# Patient Record
Sex: FEMALE | Race: WHITE
[De-identification: names, ages, dates, MRNs, and addresses within clinical notes are randomized per-mention and may not be internally consistent; named-entity substitution may affect disease eponyms.]

---

## 2018-06-04 ENCOUNTER — HOSPITAL ENCOUNTER (EMERGENCY)
Dept: HOSPITAL 62 - ER | Age: 43
Discharge: HOME | End: 2018-06-04
Payer: SELF-PAY

## 2018-06-04 VITALS — SYSTOLIC BLOOD PRESSURE: 138 MMHG | DIASTOLIC BLOOD PRESSURE: 74 MMHG

## 2018-06-04 DIAGNOSIS — Z76.0: Primary | ICD-10-CM

## 2018-06-04 DIAGNOSIS — M79.1: ICD-10-CM

## 2018-06-04 DIAGNOSIS — R51: ICD-10-CM

## 2018-06-04 DIAGNOSIS — Z79.899: ICD-10-CM

## 2018-06-04 DIAGNOSIS — I10: ICD-10-CM

## 2018-06-04 DIAGNOSIS — E11.9: ICD-10-CM

## 2018-06-04 DIAGNOSIS — J45.909: ICD-10-CM

## 2018-06-04 PROCEDURE — 99281 EMR DPT VST MAYX REQ PHY/QHP: CPT

## 2018-06-04 NOTE — ER DOCUMENT REPORT
ED General





- General


Chief Complaint: Headache


Stated Complaint: HEADACHE/BODY ACHE


Time Seen by Provider: 06/04/18 14:42


TRAVEL OUTSIDE OF THE [place] IN LAST 30 DAYS: No





- HPI


Patient complains to provider of: Medication refill


Notes: 





Patient recently moved to the community from [place].  Has been out of her 

Seroquel for 3 days is looking for a refill 300 mg Seroquel at bedtime.  Has 

attempted to call a local family doctor will follow-up in 2 days.





Eyes all symptoms to me.  States she just wants a refill.





- Related Data


Allergies/Adverse Reactions: 


 





clemastine fumarate [From Tavist-D] Allergy (Verified 06/04/18 14:05)


 


morphine [Morphine] Allergy (Verified 06/04/18 14:05)


 


Penicillins Allergy (Verified 06/04/18 14:05)


 


phenylpropanolamine HCl [From Tavist-D] Allergy (Verified 06/04/18 14:05)


 











Past Medical History





- Social History


Smoking Status: Unknown if Ever Smoked


Family History: Other - Asthma.





- Past Medical History


Cardiac Medical History: Reports: Hx Hypertension


   Denies: Hx Coronary Artery Disease, Hx Heart Attack


Pulmonary Medical History: Reports: Hx Asthma, Hx Bronchitis


   Denies: Hx COPD, Hx Pneumonia


Neurological Medical History: Denies: Hx Cerebrovascular Accident, Hx Seizures


Endocrine Medical History: Reports: Hx Diabetes Mellitus Type 2, Hx 

Hypothyroidism


Renal/ Medical History: Reports: Hx Kidney Stones


Musculoskeltal Medical History: Reports Hx Arthritis - Bursitis R hip, on 

Celebrex


Psychiatric Medical History: Reports: Hx Bipolar Disorder, Hx Depression


Past Surgical History: Denies: Hx Hysterectomy, Hx Pacemaker





- Immunizations


Hx Diphtheria, Pertussis, Tetanus Vaccination: Yes


Hx Pneumococcal Vaccination: 01/01/01





Review of Systems





- Review of Systems


Notes: 


REVIEW OF SYSTEMS:


CONSTITUTIONAL: -fevers, -chills


EENT: -eye pain, -difficulty swallowing, -nasal congestion


CARDIOVASCULAR: -chest pain, -syncope.


RESPIRATORY: -cough, -SOB


GASTROINTESTINAL: -abdominal pain, -nausea, -vomiting, -diarrhea


GENITOURINARY: -dysuria, -hematuria


MUSCULOSKELETAL: -back pain, -neck pain


SKIN: -rash or skin lesions.


HEMATOLOGIC: -easy bruising or bleeding.


LYMPHATIC: -swollen, enlarged glands.


NEUROLOGICAL: -altered mental status or loss of consciousness, -headache, -

neurologic symptoms


PSYCHIATRIC: -anxiety, -depression.


ALL OTHER SYSTEMS REVIEWED AND NEGATIVE.





Physical Exam





- Vital signs


Vitals: 





 











Temp Pulse Resp BP Pulse Ox


 


 98.1 F   77   16   138/74 H  93 


 


 06/04/18 14:19  06/04/18 14:19  06/04/18 14:19  06/04/18 14:19  06/04/18 14:19














- Notes


Notes: 





PHYSICAL EXAMINATION:


GENERAL: Well-appearing, well-nourished and in no acute distress.


HEAD: Atraumatic, normocephalic.


EYES: Pupils equal round and reactive to light, extraocular movements intact, 

sclera anicteric, conjunctiva are normal.


ENT: nares patent, oropharynx clear without exudates.  Moist mucous membranes.


NECK: Normal range of motion, supple without lymphadenopathy


LUNGS: Breath sounds clear to auscultation bilaterally and equal.  No wheezes 

rales or rhonchi.


HEART: Regular rate and rhythm without murmurs


ABDOMEN: Soft, nontender, normoactive bowel sounds.  No guarding, no rebound.  

No masses appreciated.


EXTREMITIES: Normal range of motion, no pitting or edema.  No cyanosis.


NEUROLOGICAL: Cranial nerves grossly intact.  Normal speech, normal gait.  

Normal sensory and motor exams.


PSYCH: Normal mood, normal affect.


SKIN: Warm, Dry, normal turgor, no rashes or lesions noted.





Course





- Re-evaluation


Re-evalutation: 





06/04/18 14:43


Patient wants a refill of her Seroquel.





- Vital Signs


Vital signs: 





 











Temp Pulse Resp BP Pulse Ox


 


 98.1 F   77   16   138/74 H  93 


 


 06/04/18 14:19  06/04/18 14:19  06/04/18 14:19  06/04/18 14:19  06/04/18 14:19














Discharge





- Discharge


Clinical Impression: 


 Medication refill





Condition: Stable


Instructions:  Headache ([facility])


Additional Instructions: 


Call [facility] in [place], [place]


Prescriptions: 


Quetiapine Fumarate [Seroquel] 300 mg PO QHS #7 tablet


Referrals: 


ЕЛЕНА ELLIS MD [NO LOCAL MD] - Follow up as needed

## 2018-06-11 ENCOUNTER — HOSPITAL ENCOUNTER (EMERGENCY)
Dept: HOSPITAL 62 - ER | Age: 43
Discharge: HOME | End: 2018-06-11
Payer: SELF-PAY

## 2018-06-11 VITALS — SYSTOLIC BLOOD PRESSURE: 145 MMHG | DIASTOLIC BLOOD PRESSURE: 93 MMHG

## 2018-06-11 DIAGNOSIS — Z79.899: ICD-10-CM

## 2018-06-11 DIAGNOSIS — J45.909: ICD-10-CM

## 2018-06-11 DIAGNOSIS — I10: ICD-10-CM

## 2018-06-11 DIAGNOSIS — E11.9: ICD-10-CM

## 2018-06-11 DIAGNOSIS — Z76.0: Primary | ICD-10-CM

## 2018-06-11 PROCEDURE — 99281 EMR DPT VST MAYX REQ PHY/QHP: CPT

## 2018-06-11 NOTE — ER DOCUMENT REPORT
HPI





- HPI


Pain Level: 0


Notes: 





Patient is a 43-year-old female who presents to the ED for refill of her 

Seroquel that she takes at bedtime.  Patient states that she has a history of 

mental health disorders and uses that to keep herself out of the hospital from 

having a "mental breakdown."  Patient recently moved to the area from 

[place] and is scheduled for her initial appointment in 4 days with a 

therapist.  Patient states that she is working on getting established with a 

primary care provider at [facility] as well.  Patient states that she has 

no other concerns or complaints and would just like a refill.  She has not had 

any visual or auditory hallucinations.  She has no SI/HI.  She is otherwise 

eating and drinking without difficulties.  She is urinating normally and having 

normal bowel movements.  Denies any headache, fever, neck pain, changes in 

vision/speech/mentation/hearing, URI, sore throat, chest pain, palpitations, 

syncope, cough, shortness of breath, wheeze, dyspnea, abdominal pain, nausea/

vomiting/diarrhea, urinary retention, dysuria, hematuria, or rash.





- ROS


Systems Reviewed and Negative: Yes All other systems reviewed and negative





- CONSTITUTIONAL


Constitutional: DENIES: Fever, Chills





- EENT


EENT: DENIES: Sore Throat, Ear Pain, Eye problems





- NEURO


Neurology: DENIES: Headache, Weakness, Vision blurred, Dizzinesss / Vertigo





- CARDIOVASCULAR


Cardiovascular: DENIES: Chest pain





- RESPIRATORY


Respiratory: DENIES: Trouble Breathing, Coughing





- GASTROINTESTINAL


Gastrointestinal: DENIES: Abdominal Pain, Black / Bloody Stools





- URINARY


Urinary: DENIES: Dysuria, Urgency, Frequency





- REPRODUCTIVE


Reproductive: DENIES: Pregnant:





- MUSCULOSKELETAL


Musculoskeletal: DENIES: Extremity pain





Past Medical History





- Social History


Smoking Status: Never Smoker


Chew tobacco use (# tins/day): No


Frequency of alcohol use: None


Drug Abuse: None


Family History: Reviewed & Not Pertinent, Other - Asthma.


Patient has suicidal ideation: No


Patient has homicidal ideation: No





- Past Medical History


Cardiac Medical History: Reports: Hx Hypertension


   Denies: Hx Coronary Artery Disease, Hx Heart Attack


Pulmonary Medical History: Reports: Hx Asthma, Hx Bronchitis


   Denies: Hx COPD, Hx Pneumonia


Neurological Medical History: Denies: Hx Cerebrovascular Accident, Hx Seizures


Endocrine Medical History: Reports: Hx Diabetes Mellitus Type 2, Hx 

Hypothyroidism


Renal/ Medical History: Reports: Hx Kidney Stones.  Denies: Hx Peritoneal 

Dialysis


Musculoskeltal Medical History: Reports Hx Arthritis - Bursitis R hip, on 

Celebrex


Psychiatric Medical History: Reports: Hx Bipolar Disorder, Hx Depression


Past Surgical History: Denies: Hx Hysterectomy, Hx Pacemaker





- Immunizations


Hx Diphtheria, Pertussis, Tetanus Vaccination: Yes


Hx Pneumococcal Vaccination: 01/01/01





Vertical Provider Document





- CONSTITUTIONAL


Agree With Documented VS: Yes


Notes: 





PHYSICAL EXAMINATION:





GENERAL: Well-appearing, well-nourished and in no acute distress.





HEAD: Atraumatic, normocephalic.





EYES: Pupils equal round and reactive to light, extraocular movements intact, 

sclera anicteric, conjunctiva are normal.





LUNGS: Breath sounds clear to auscultation bilaterally and equal.  No wheezes 

rales or rhonchi.





HEART: Regular rate and rhythm without murmurs, rubs, gallops.





PSYCH: Normal mood, normal affect.





SKIN: Warm, Dry, normal turgor, no rashes or lesions noted.





- INFECTION CONTROL


TRAVEL OUTSIDE OF THE [place] IN LAST 30 DAYS: No





Course





- Re-evaluation


Re-evalutation: 





06/11/18 13:59


Patient is an afebrile, well-hydrated, 43-year-old female who presents to the 

ED for medication refill of her Seroquel.  Vitals are acceptable.  PE is 

otherwise unremarkable.  Patient has no SI/HI nor any visual or auditory 

hallucinations.  No labs or imaging warranted at this time based on H&P.  

Advised patient that I will refill for 1 week, but she needs to establish with 

her PCM or get a refill from her psych provider thereafter.  Return to the ED 

with any other worsening/concerning symptoms otherwise as reviewed discharge.  

Patient is in agreement.





- Vital Signs


Vital signs: 


 











Temp Pulse Resp BP Pulse Ox


 


 98.5 F   79   20   145/93 H  96 


 


 06/11/18 13:31  06/11/18 13:31  06/11/18 13:31  06/11/18 13:31  06/11/18 13:31














Discharge





- Discharge


Clinical Impression: 


 Medication refill





Condition: Stable


Disposition: HOME, SELF-CARE


Additional Instructions: 


Establish with a primary care provider in the next week


Keep appointment with your psych clinic as scheduled


Return to the ED with any worsening symptoms and/or development of fever, 

headache, changes in behavior/mentation/vision/speech, chest pain, palpitations

, syncope, shortness of breath, trouble breathing, abdominal pain, n/v/d, blood 

in stool/urine, loss of control of bowel/bladder, urinary retention, muscle 

weakness/paralysis, saddle anesthesia, numbness/tingling, suicidal/homicidal 

thoughts/ideations, or other worsening symptoms that are concerning to you.


Prescriptions: 


Quetiapine Fumarate [Seroquel] 300 mg PO QHS #7 tablet


Forms:  Elevated Blood Pressure


Referrals: 


[facility] [Provider Group] - Follow up as needed